# Patient Record
Sex: MALE | Race: WHITE | NOT HISPANIC OR LATINO | Employment: FULL TIME | URBAN - METROPOLITAN AREA
[De-identification: names, ages, dates, MRNs, and addresses within clinical notes are randomized per-mention and may not be internally consistent; named-entity substitution may affect disease eponyms.]

---

## 2022-07-11 ENCOUNTER — TELEPHONE (OUTPATIENT)
Dept: SCHEDULING | Facility: IMAGING CENTER | Age: 37
End: 2022-07-11

## 2022-08-24 ENCOUNTER — OFFICE VISIT (OUTPATIENT)
Dept: MEDICAL GROUP | Facility: PHYSICIAN GROUP | Age: 37
End: 2022-08-24
Payer: OTHER GOVERNMENT

## 2022-08-24 VITALS
HEIGHT: 69 IN | BODY MASS INDEX: 29.15 KG/M2 | WEIGHT: 196.8 LBS | OXYGEN SATURATION: 97 % | SYSTOLIC BLOOD PRESSURE: 110 MMHG | RESPIRATION RATE: 18 BRPM | TEMPERATURE: 98.1 F | DIASTOLIC BLOOD PRESSURE: 70 MMHG | HEART RATE: 82 BPM

## 2022-08-24 DIAGNOSIS — M25.521 RIGHT ELBOW PAIN: ICD-10-CM

## 2022-08-24 DIAGNOSIS — Z13.21 ENCOUNTER FOR VITAMIN DEFICIENCY SCREENING: ICD-10-CM

## 2022-08-24 DIAGNOSIS — M79.641 HAND PAIN, RIGHT: ICD-10-CM

## 2022-08-24 DIAGNOSIS — G89.29 CHRONIC PAIN OF BOTH KNEES: ICD-10-CM

## 2022-08-24 DIAGNOSIS — G89.29 CHRONIC PAIN OF RIGHT ANKLE: ICD-10-CM

## 2022-08-24 DIAGNOSIS — M25.561 CHRONIC PAIN OF BOTH KNEES: ICD-10-CM

## 2022-08-24 DIAGNOSIS — Z87.81 HISTORY OF HAND FRACTURE: ICD-10-CM

## 2022-08-24 DIAGNOSIS — F34.1 DYSTHYMIA: ICD-10-CM

## 2022-08-24 DIAGNOSIS — G89.29 CHRONIC MIDLINE LOW BACK PAIN WITHOUT SCIATICA: ICD-10-CM

## 2022-08-24 DIAGNOSIS — M25.562 CHRONIC PAIN OF BOTH KNEES: ICD-10-CM

## 2022-08-24 DIAGNOSIS — M25.571 CHRONIC PAIN OF RIGHT ANKLE: ICD-10-CM

## 2022-08-24 DIAGNOSIS — Z13.6 SCREENING FOR CARDIOVASCULAR CONDITION: ICD-10-CM

## 2022-08-24 DIAGNOSIS — M54.50 CHRONIC MIDLINE LOW BACK PAIN WITHOUT SCIATICA: ICD-10-CM

## 2022-08-24 PROCEDURE — 99204 OFFICE O/P NEW MOD 45 MIN: CPT | Performed by: NURSE PRACTITIONER

## 2022-08-24 ASSESSMENT — PATIENT HEALTH QUESTIONNAIRE - PHQ9
CLINICAL INTERPRETATION OF PHQ2 SCORE: 2
SUM OF ALL RESPONSES TO PHQ QUESTIONS 1-9: 7
5. POOR APPETITE OR OVEREATING: 0 - NOT AT ALL

## 2022-08-25 NOTE — ASSESSMENT & PLAN NOTE
Patient is 37 y.o. male here to establish care with me today.  Chronic lateral right elbow pain.  Also has palpable mass superior to right elbow, not new.    Hasn't tried anything to help for relief.

## 2022-08-25 NOTE — ASSESSMENT & PLAN NOTE
Patient is 37 y.o. male here to establish care with me today.  Chronic problem.  Worse with extension.

## 2022-08-25 NOTE — ASSESSMENT & PLAN NOTE
Patient is 37 y.o. male here to establish care with me today.  Chronic pain.  Worse with squatting.  Dull aching pain.  Denies laxity.

## 2022-08-25 NOTE — PROGRESS NOTES
"CC:  establish care, multiple areas of pain    HISTORY OF THE PRESENT ILLNESS: Patient is a 37 y.o. male. This pleasant patient is here today to establish care and for evaluation and management of the following health problems.    Has not had PCP in adulthood.        Right elbow pain  Patient is 37 y.o. male here to establish care with me today.  Chronic lateral right elbow pain.  Also has palpable mass superior to right elbow, not new.    Hasn't tried anything to help for relief.    Hand pain, right  Patient is 37 y.o. male here to establish care with me today.  Chronic.  Had boxer's fracture in 2009 while in the .  No treatment received.  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  Hand will \"lock up\" at times.    Chronic pain of right ankle  Patient is 37 y.o. male here to establish care with me today.  History of rolling ankle while in  in 2010.  Continues to have pain, waxes and wanes.  Will lock up at times for 30 to 40 minutes.  Has not tried anything.  Random click/pop with ambulation.  Denies laxity, redness.    Chronic pain of both knees  Patient is 37 y.o. male here to establish care with me today.  Chronic pain.  Worse with squatting.  Dull aching pain.  Denies laxity.    Chronic midline low back pain without sciatica  Patient is 37 y.o. male here to establish care with me today.  Chronic problem.  Worse with extension.      Dysthymia  Patient is 37 y.o. male here to establish care with me today.  PHQ-9 score is 7.  Seeing a therapist.  Leaving [employer] job. .    Allergies: Patient has no known allergies.    No current [facility]-ordered outpatient medications on file.     No current [facility]-ordered facility-administered medications on file.       History reviewed. No pertinent past medical history.    Past Surgical History:   Procedure Laterality Date    OTHER Bilateral     LASIK       Social History     Tobacco Use    Smoking status: Former     Types: " "Cigarettes     Quit date:      Years since quittin.6    Smokeless tobacco: Former     Types: Chew     Quit date:    Vaping Use    Vaping Use: Never used   Substance Use Topics    Alcohol use: Not Currently     Comment: occ    Drug use: No       History reviewed. No pertinent family history.    ROS: As in HPI, otherwise negative for chest pain, dyspnea, abdominal pain, dysuria, blood in stool, fever            Exam: /70   Pulse 82   Temp 36.7 °C (98.1 °F) (Temporal)   Resp 18   Ht 1.753 m (5' 9\")   Wt 89.3 kg (196 lb 12.8 oz)   SpO2 97%  Body mass index is 29.06 kg/m².    General: Alert, pleasant, well nourished, well developed male in NAD  HEENT: Normocephalic. Eyes conjunctiva clear lids without ptosis, pupils equal and reactive to light, ears normal shape and contour, canals are clear bilaterally, tympanic membranes are pearly gray with good light reflex, nasal mucosa without erythema and drainage, oropharynx is without erythema, edema or exudates.   Neck: Supple without bruit. Thyroid is not enlarged.  Pulmonary: Clear to ausculation.  Normal effort. No rales, ronchi, or wheezing.  Cardiovascular: Normal rate and rhythm without murmur. Carotid and radial pulses are intact and equal bilaterally.  No lower extremity edema.  Abdomen: Soft, nontender, nondistended. Normal bowel sounds. Liver and spleen are not palpable  Neurologic: Grossly nonfocal  Lymph: No cervical or supraclavicular lymph nodes are palpable  Skin: Warm and dry.   Right UE: no erythema or edema, non tender to palpation,  strength 5/5 and equal bilaterally, marble size soft palpable mass just superior to elbow   Low back: no erythema or edema, nontender to palpation, able to tiptoe and heel walk, full active ROM, patellar DTR's 1+ and equal bilaterally  Musculoskeletal: Normal gait.   Psych: Normal mood and affect. Alert and oriented. Judgment and insight is normal.    Please note that this dictation was created using " voice recognition software. I have made every reasonable attempt to correct obvious errors, but I expect that there are errors of grammar and possibly content that I did not discover before finalizing the note.      Assessment/Plan  1. Hand pain, right      2. History of hand fracture    - Referral to Orthopedics    3. Right elbow pain    - Referral to Orthopedics    4. Chronic pain of right ankle    - DX-ANKLE 3+ VIEWS RIGHT; Future  - Referral to Orthopedics  - Referral to Orthopedics    5. Chronic midline low back pain without sciatica    - Referral to Physical Therapy    6. Chronic pain of both knees    - Referral to Physical Therapy  - DX-KNEE COMPLETE 4+ LEFT; Future  - DX-KNEE COMPLETE 4+ RIGHT; Future    7. Screening for cardiovascular condition    - Comp Metabolic Panel; Future  - ESTIMATED GFR; Future  - Lipid Profile; Future  - CBC WITHOUT DIFFERENTIAL; Future    8. Encounter for vitamin deficiency screening    - VITAMIN D,25 HYDROXY; Future    9. Dysthymia        Clinical decision making:  Will refer to ortho for ankle, hand, and elbow pain.  Will defer further workup of arm mass to ortho.  Will get xray.  Refer to PT for knee and back pain.  Also advised on ice, heat, NSAIDS.      Patient will rtc in 3 months for lab review and f/u on pain or sooner if needed.

## 2022-08-25 NOTE — ASSESSMENT & PLAN NOTE
Patient is 37 y.o. male here to establish care with me today.  History of rolling ankle while in  in 2010.  Continues to have pain, waxes and wanes.  Will lock up at times for 30 to 40 minutes.  Has not tried anything.  Random click/pop with ambulation.  Denies laxity, redness.

## 2022-08-25 NOTE — ASSESSMENT & PLAN NOTE
Patient is 37 y.o. male here to establish care with me today.  PHQ-9 score is 7.  Seeing a therapist.  Leaving [employer] job. .

## 2022-08-25 NOTE — ASSESSMENT & PLAN NOTE
"Patient is 37 y.o. male here to establish care with me today.  Chronic.  Had boxer's fracture in 2009 while in the .  No treatment received.  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  Hand will \"lock up\" at times.  "